# Patient Record
Sex: FEMALE | Race: ASIAN | NOT HISPANIC OR LATINO
[De-identification: names, ages, dates, MRNs, and addresses within clinical notes are randomized per-mention and may not be internally consistent; named-entity substitution may affect disease eponyms.]

---

## 2018-02-13 ENCOUNTER — TRANSCRIPTION ENCOUNTER (OUTPATIENT)
Age: 48
End: 2018-02-13

## 2020-04-26 ENCOUNTER — MESSAGE (OUTPATIENT)
Age: 50
End: 2020-04-26

## 2020-05-07 LAB
SARS-COV-2 IGG SERPL IA-ACNC: <0.1 INDEX
SARS-COV-2 IGG SERPL QL IA: NEGATIVE

## 2020-05-08 ENCOUNTER — APPOINTMENT (OUTPATIENT)
Dept: DISASTER EMERGENCY | Facility: CLINIC | Age: 50
End: 2020-05-08

## 2021-06-29 ENCOUNTER — OUTPATIENT (OUTPATIENT)
Dept: OUTPATIENT SERVICES | Facility: HOSPITAL | Age: 51
LOS: 1 days | End: 2021-06-29

## 2021-06-29 ENCOUNTER — APPOINTMENT (OUTPATIENT)
Dept: PRIMARY CARE | Facility: HOSPITAL | Age: 51
End: 2021-06-29

## 2021-06-29 VITALS
SYSTOLIC BLOOD PRESSURE: 124 MMHG | DIASTOLIC BLOOD PRESSURE: 83 MMHG | HEART RATE: 65 BPM | RESPIRATION RATE: 14 BRPM | WEIGHT: 143 LBS | TEMPERATURE: 97.9 F | BODY MASS INDEX: 27 KG/M2 | HEIGHT: 61 IN | OXYGEN SATURATION: 100 %

## 2021-06-29 DIAGNOSIS — E78.5 HYPERLIPIDEMIA, UNSPECIFIED: ICD-10-CM

## 2021-06-29 DIAGNOSIS — Z20.822 CONTACT WITH AND (SUSPECTED) EXPOSURE TO COVID-19: ICD-10-CM

## 2021-06-29 DIAGNOSIS — J02.9 ACUTE PHARYNGITIS, UNSPECIFIED: ICD-10-CM

## 2021-06-29 LAB
B PERT DNA SPEC QL NAA+PROBE: NOT DETECTED
C PNEUM DNA SPEC QL NAA+PROBE: NOT DETECTED
FLUAV SUBTYP SPEC NAA+PROBE: NOT DETECTED
FLUBV RNA SPEC QL NAA+PROBE: NOT DETECTED
HADV DNA SPEC QL NAA+PROBE: NOT DETECTED
HCOV 229E RNA SPEC QL NAA+PROBE: NOT DETECTED
HCOV HKU1 RNA SPEC QL NAA+PROBE: NOT DETECTED
HCOV NL63 RNA SPEC QL NAA+PROBE: NOT DETECTED
HCOV OC43 RNA SPEC QL NAA+PROBE: NOT DETECTED
HMPV RNA SPEC QL NAA+PROBE: NOT DETECTED
HPIV1 RNA SPEC QL NAA+PROBE: NOT DETECTED
HPIV2 RNA SPEC QL NAA+PROBE: NOT DETECTED
HPIV3 RNA SPEC QL NAA+PROBE: NOT DETECTED
HPIV4 RNA SPEC QL NAA+PROBE: NOT DETECTED
RAPID RVP RESULT: NOT DETECTED
RSV RNA SPEC QL NAA+PROBE: NOT DETECTED
RV+EV RNA SPEC QL NAA+PROBE: NOT DETECTED
SARS-COV-2 RNA PNL RESP NAA+PROBE: NOT DETECTED

## 2021-06-29 RX ORDER — ATORVASTATIN CALCIUM 10 MG/1
10 TABLET, FILM COATED ORAL
Refills: 0 | Status: ACTIVE | COMMUNITY

## 2021-06-29 NOTE — PHYSICAL EXAM
[No Acute Distress] : no acute distress [Normal Sclera/Conjunctiva] : normal sclera/conjunctiva [Normal Outer Ear/Nose] : the outer ears and nose were normal in appearance [No Respiratory Distress] : no respiratory distress  [No Accessory Muscle Use] : no accessory muscle use [Normal Rate] : normal rate  [Regular Rhythm] : with a regular rhythm [No Focal Deficits] : no focal deficits [Normal Gait] : normal gait [Normal Affect] : the affect was normal [Normal Insight/Judgement] : insight and judgment were intact [de-identified] : no tonsular swelling , no tonsular exudates and no tonsular erythema, no maxillary tenderness  [de-identified] : no wheezing, no rhonchi and no crackles

## 2021-06-29 NOTE — HISTORY OF PRESENT ILLNESS
[FreeTextEntry8] : 51F NKDA with PMH of HLD and no PSH [facility].\par \par States that she had sore throat for 1 day associated with slight painful swallowing. She also needs COVID PCR because she has to attend a wedding this weekend. She also received two doses of COVID vaccine. Denies any fever, cough, sore throat or SOB. No loss of smell or taste, no chest tightness, or any GI related symptoms of nausea, vomiting or diarrhea. 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 She takes Atorvastatin as  regular maintenance medications. Denies any chest pain, no chest palpitations or any respiratory distress\par \par \par \par

## 2021-07-01 LAB
COVID-19 SPIKE DOMAIN ANTIBODY INTERPRETATION: POSITIVE
SARS-COV-2 AB SERPL IA-ACNC: >250 U/ML

## 2023-01-12 ENCOUNTER — NON-APPOINTMENT (OUTPATIENT)
Age: 53
End: 2023-01-12

## 2023-03-31 ENCOUNTER — APPOINTMENT (OUTPATIENT)
Dept: ORTHOPEDIC SURGERY | Facility: CLINIC | Age: 53
End: 2023-03-31
Payer: COMMERCIAL

## 2023-03-31 PROCEDURE — 99204 OFFICE O/P NEW MOD 45 MIN: CPT

## 2023-03-31 PROCEDURE — 73502 X-RAY EXAM HIP UNI 2-3 VIEWS: CPT

## 2023-03-31 RX ORDER — PREDNISONE 50 MG/1
50 TABLET ORAL DAILY
Qty: 5 | Refills: 0 | Status: ACTIVE | COMMUNITY
Start: 2023-03-31 | End: 1900-01-01

## 2023-03-31 NOTE — CONSULT LETTER
[Consult Letter:] : I had the pleasure of evaluating your patient, [unfilled]. [Please see my note below.] : Please see my note below. [Consult Closing:] : Thank you very much for allowing me to participate in the care of this patient.  If you have any questions, please do not hesitate to contact me. [Sincerely,] : Sincerely, [FreeTextEntry3] : Diana Nolasco MD, EdM\par [facility]\par Department of Orthopedics

## 2023-03-31 NOTE — ADDENDUM
[FreeTextEntry1] : ILavelle, assisted with documentation on 03/31/2021 acting as scribe for Dr. Diana Nolasco.\par \par HENRIETTA, Diana Nolasco MD, EdM, have read and attest that all the information, medical decision making and discharge instructions within are true and accurate  Hydroxyzine Counseling: Patient advised that the medication is sedating and not to drive a car after taking this medication.  Patient informed of potential adverse effects including but not limited to dry mouth, urinary retention, and blurry vision.  The patient verbalized understanding of the proper use and possible adverse effects of hydroxyzine.  All of the patient's questions and concerns were addressed.

## 2023-03-31 NOTE — PHYSICAL EXAM
[Normal] : Gait: normal [de-identified] : EXAM: \par Gen: in no acute distress, seated comfortably, moving easily\par Skin: No discoloration, rashes; on palpation skin is dry, \par Neuro: Normal sensation all dermatomes, motor all myotomes\par Vascular: Normal pulses, no edema, normal temperature\par Coordination and balance: Normal\par Psych: normal mood and affect, non pressured speech, alert and oriented x3\par Musculoskeletal:\par \par Right HIP /PELVIS -\par Inspection reveals no bruising\par Range of motion testing shows full passive and active\par no pain with resisted adduction\par no pain with resisted hip flexion\par Hip maneuvers:\par +ADEEL\par +FADIR\par passive hip impingement sign negative\par ADEEL negative\par Log roll negative\par Resisted active straight leg raise negative\par no TTP of the buttock, greater trochanters, IT band \par NEURO - Normal bulk and tone \par LE strength 5/5 including hip flexion, knee flexion, knee extension, ankle dorsiflexion, ankle plantarflexion, eversion, and EHL \par Sensation - intact to light touch in bilateral lower extremities. \par LE Reflexes 2+ patellar and Achilles reflexes bilaterally \par no Clonus\par Coordination was age appropriate and intact in all 4 limbs. \par GAIT - Normal base, normal stride length, non-antalgic\par  [de-identified] : Patient comes to today's visit with outside imaging already performed.  I reviewed the images in detail with the patient and discussed the findings as highlighted below. \par \par 4 views of the lumbar spine and 3 views of the sacroiliac joints were reviewed today that show\par * 1.2 cm area of sclerosis along the left sacral ala, new from the prior study 2019 but further evaluation with a bone scan is recommended to chew the possibility of malignancy.\par * Multilevel degenerative changes.\par * Grade 1 anterolisthesis L4 and L5. \par \par \par The following radiographs were ordered and read by me during this patient's visit. I reviewed each radiograph in detail with the patient and discussed the findings as highlighted below. \par \par 3 views of the right hip were obtained today that show no fracture, or dislocation. There are no degenerative changes seen. There is no malalignment. No obvious osseous abnormality. Otherwise unremarkable.

## 2023-03-31 NOTE — DISCUSSION/SUMMARY
[de-identified] : Discussed findings of today's exam and possible causes of patient's pain.  Discussed findings anterolisthesis which is likely causing neuroforaminal stenosis contributing toward her right leg pain. No groin pain today.  Reviewed possible courses of treatment, and we collaboratively decided best course of treatment at this time will include:\par 1.  Lumbar MRI ordered to further evaluate anterolisthesis. \par 2.  Prescriptions for PT and prednisone provided\par 3.  I have encouraged her to get the bone scan ordered by her PCP to evaluate the lesion seen on her sacrum.\par 4.  Follow up after imaging. Patient was instructed to call the office when MRI is complete to set up a follow up appointment for discussion of results and further treatment. A phone call will only be made to the patient in the case of urgent findings requiring immediate attention.		\par \par Diana Nolasco MD, EdM\par [facility]\par Department of Orthopedics

## 2023-03-31 NOTE — HISTORY OF PRESENT ILLNESS
[de-identified] : YEE   is a 52 year old F who presents with right lateral hip and groin pain. The pain is primarily located at the lateral anterior aspect of the right hip.   It began in 6 days ago, without injury or trauma.  Pain is described as sharp  in nature,worse with rotation and increased activity, better with rest.\par She states that she performed a gym work out on Friday March 24th and wondered if her work out may have caused it.\par No inciting injury or trauma\par States that the pain has been getting worse\par She has a history of low back pain for more than 5 years\par She has been performing physical therapy for the low back\par + XR of L-spine done at [facility]\par Seen another physician/urgent care?  Imaging? Treatments?\par Reports numbness in the right calf at nights on occasions\par Denies bowel/bladder changes, fevers, chills, saddle anesthesia.  Denies tingling, weakness of the lower extremities\par

## 2023-05-11 ENCOUNTER — APPOINTMENT (OUTPATIENT)
Dept: MRI IMAGING | Facility: IMAGING CENTER | Age: 53
End: 2023-05-11
Payer: COMMERCIAL

## 2023-05-11 ENCOUNTER — OUTPATIENT (OUTPATIENT)
Dept: OUTPATIENT SERVICES | Facility: HOSPITAL | Age: 53
LOS: 1 days | End: 2023-05-11
Payer: COMMERCIAL

## 2023-05-11 DIAGNOSIS — M43.10 SPONDYLOLISTHESIS, SITE UNSPECIFIED: ICD-10-CM

## 2023-05-11 PROCEDURE — 72148 MRI LUMBAR SPINE W/O DYE: CPT

## 2023-05-11 PROCEDURE — 72148 MRI LUMBAR SPINE W/O DYE: CPT | Mod: 26

## 2023-06-06 ENCOUNTER — APPOINTMENT (OUTPATIENT)
Dept: ORTHOPEDIC SURGERY | Facility: CLINIC | Age: 53
End: 2023-06-06
Payer: COMMERCIAL

## 2023-06-06 DIAGNOSIS — M43.10 SPONDYLOLISTHESIS, SITE UNSPECIFIED: ICD-10-CM

## 2023-06-06 PROCEDURE — 99214 OFFICE O/P EST MOD 30 MIN: CPT

## 2023-06-08 PROBLEM — M43.10 SPONDYLOLISTHESIS: Status: ACTIVE | Noted: 2023-03-31

## 2023-06-23 NOTE — HISTORY OF PRESENT ILLNESS
[de-identified] : YEE   is a 52 year old F who presents for follow up of lower back pain.  It began 3/25/23 without injury or trauma.  Pain has now localized to the low back, and is described as dull in nature. Now denies pain radiating to the lower extremities. Patient was last seen 3/31/23. She reports doing 2 sessions of physical therapy, which helped, but her script has now . Overall she states the pain has been improving.\par She has a history of low back pain for more than 5 years\par Denies bowel/bladder changes, fevers, chills, saddle anesthesia.  Denies tingling, weakness of the lower extremities\par

## 2023-06-23 NOTE — REASON FOR VISIT
[Initial Visit] : an initial visit for [Other: ____] : [unfilled] [FreeTextEntry2] : right hip and low back pain

## 2023-06-23 NOTE — PHYSICAL EXAM
[Normal] : Gait: normal [de-identified] : EXAM: \par Gen: in no acute distress, seated comfortably, moving easily\par Skin: No discoloration, rashes; on palpation skin is dry, \par Neuro: Normal sensation all dermatomes, motor all myotomes\par Vascular: Normal pulses, no edema, normal temperature\par Coordination and balance: Normal\par Psych: normal mood and affect, non pressured speech, alert and oriented x3\par Musculoskeletal:\par \par Right HIP /PELVIS -\par Inspection reveals no bruising\par Range of motion testing shows full passive and active\par no pain with resisted adduction\par no pain with resisted hip flexion\par Hip maneuvers:\par +ADEEL\par +FADIR\par passive hip impingement sign negative\par ADEEL negative\par Log roll negative\par Resisted active straight leg raise negative\par no TTP of the buttock, greater trochanters, IT band \par NEURO - Normal bulk and tone \par LE strength 5/5 including hip flexion, knee flexion, knee extension, ankle dorsiflexion, ankle plantarflexion, eversion, and EHL \par Sensation - intact to light touch in bilateral lower extremities. \par LE Reflexes 2+ patellar and Achilles reflexes bilaterally \par no Clonus\par Coordination was age appropriate and intact in all 4 limbs. \par GAIT - Normal base, normal stride length, non-antalgic\par

## 2023-06-23 NOTE — DISCUSSION/SUMMARY
[de-identified] : Oscar and I discussed her pain, which is improving. I provided her with a new referral to physical therapy. Follow up in 6 weeks. 	\par \par Diana Nolasco MD, EdM\par [facility]\par Department of Orthopedics

## 2025-04-22 ENCOUNTER — NON-APPOINTMENT (OUTPATIENT)
Age: 55
End: 2025-04-22

## 2025-06-30 ENCOUNTER — APPOINTMENT (OUTPATIENT)
Dept: RADIOLOGY | Facility: IMAGING CENTER | Age: 55
End: 2025-06-30
Payer: COMMERCIAL

## 2025-06-30 ENCOUNTER — OUTPATIENT (OUTPATIENT)
Dept: OUTPATIENT SERVICES | Facility: HOSPITAL | Age: 55
LOS: 1 days | End: 2025-06-30
Payer: COMMERCIAL

## 2025-06-30 DIAGNOSIS — Z00.8 ENCOUNTER FOR OTHER GENERAL EXAMINATION: ICD-10-CM

## 2025-06-30 PROCEDURE — 71046 X-RAY EXAM CHEST 2 VIEWS: CPT

## 2025-06-30 PROCEDURE — 71046 X-RAY EXAM CHEST 2 VIEWS: CPT | Mod: 26

## 2025-07-17 ENCOUNTER — NON-APPOINTMENT (OUTPATIENT)
Age: 55
End: 2025-07-17

## 2025-09-11 ENCOUNTER — APPOINTMENT (OUTPATIENT)
Dept: MRI IMAGING | Facility: IMAGING CENTER | Age: 55
End: 2025-09-11
Payer: COMMERCIAL

## 2025-09-11 PROCEDURE — 75561 CARDIAC MRI FOR MORPH W/DYE: CPT | Mod: 26

## 2025-09-11 PROCEDURE — 75565 CARD MRI VELOC FLOW MAPPING: CPT | Mod: 26
